# Patient Record
Sex: MALE | Race: BLACK OR AFRICAN AMERICAN | URBAN - METROPOLITAN AREA
[De-identification: names, ages, dates, MRNs, and addresses within clinical notes are randomized per-mention and may not be internally consistent; named-entity substitution may affect disease eponyms.]

---

## 2018-01-18 ENCOUNTER — OFFICE VISIT (OUTPATIENT)
Dept: PULMONOLOGY | Age: 48
End: 2018-01-18

## 2018-01-18 VITALS
HEIGHT: 66 IN | SYSTOLIC BLOOD PRESSURE: 138 MMHG | HEART RATE: 93 BPM | BODY MASS INDEX: 50.62 KG/M2 | WEIGHT: 315 LBS | OXYGEN SATURATION: 95 % | RESPIRATION RATE: 16 BRPM | DIASTOLIC BLOOD PRESSURE: 80 MMHG

## 2018-01-18 DIAGNOSIS — I10 ESSENTIAL HYPERTENSION: Chronic | ICD-10-CM

## 2018-01-18 DIAGNOSIS — G47.33 OBSTRUCTIVE SLEEP APNEA: Primary | Chronic | ICD-10-CM

## 2018-01-18 DIAGNOSIS — E11.9 CONTROLLED TYPE 2 DIABETES MELLITUS WITHOUT COMPLICATION, WITHOUT LONG-TERM CURRENT USE OF INSULIN (HCC): Chronic | ICD-10-CM

## 2018-01-18 DIAGNOSIS — J30.9 ALLERGIC RHINITIS, UNSPECIFIED CHRONICITY, UNSPECIFIED SEASONALITY, UNSPECIFIED TRIGGER: Chronic | ICD-10-CM

## 2018-01-18 PROCEDURE — 99214 OFFICE O/P EST MOD 30 MIN: CPT | Performed by: NURSE PRACTITIONER

## 2018-01-18 RX ORDER — AMLODIPINE BESYLATE 10 MG/1
10 TABLET ORAL DAILY
COMMUNITY
Start: 2017-12-31

## 2018-01-18 RX ORDER — ATORVASTATIN CALCIUM 10 MG/1
10 TABLET, FILM COATED ORAL DAILY
COMMUNITY
Start: 2017-12-31

## 2018-01-18 ASSESSMENT — SLEEP AND FATIGUE QUESTIONNAIRES
HOW LIKELY ARE YOU TO NOD OFF OR FALL ASLEEP IN A CAR, WHILE STOPPED FOR A FEW MINUTES IN TRAFFIC: 0
HOW LIKELY ARE YOU TO NOD OFF OR FALL ASLEEP WHILE SITTING QUIETLY AFTER LUNCH WITHOUT ALCOHOL: 0
HOW LIKELY ARE YOU TO NOD OFF OR FALL ASLEEP WHILE SITTING AND READING: 0
ESS TOTAL SCORE: 2
HOW LIKELY ARE YOU TO NOD OFF OR FALL ASLEEP WHEN YOU ARE A PASSENGER IN A CAR FOR AN HOUR WITHOUT A BREAK: 0
HOW LIKELY ARE YOU TO NOD OFF OR FALL ASLEEP WHILE SITTING INACTIVE IN A PUBLIC PLACE: 0
HOW LIKELY ARE YOU TO NOD OFF OR FALL ASLEEP WHILE SITTING AND TALKING TO SOMEONE: 0
HOW LIKELY ARE YOU TO NOD OFF OR FALL ASLEEP WHILE LYING DOWN TO REST IN THE AFTERNOON WHEN CIRCUMSTANCES PERMIT: 2
HOW LIKELY ARE YOU TO NOD OFF OR FALL ASLEEP WHILE WATCHING TV: 0

## 2018-01-18 ASSESSMENT — ENCOUNTER SYMPTOMS
ABDOMINAL DISTENTION: 0
RHINORRHEA: 0
ABDOMINAL PAIN: 0
SHORTNESS OF BREATH: 0
COUGH: 0
SINUS PRESSURE: 0
APNEA: 0

## 2018-01-18 NOTE — PROGRESS NOTES
Musa Starr MD, FAASM, [facility]  Phi Connelly, MSN, RN, CNP [place]  [address]  3rd Floor, [address]  [address], [address]  326 [facility] [phone]   [address] [place] [address] Drive 25 [facility]  [address]   [facility] AT [facility], [facility]. [address] [phone]       [address] MEDICINE    Subjective:     Patient ID: Sondra Crockett is a 50 y.o. male. Chief Complaint   Patient presents with    Sleep Apnea     CFU       HPI:      Machine Present today:  Yes and machine will not power on    Machine Modem/Download Info:                  CPAP - Settings  Pressure: 20 cmH2O                     PAP Mask  Mask Type: Nasal mask     [place] - Total score: 2    Follow-up :     Last Visit : 2015      Patient reports the listed Co-morbidities are stable and fluctuating at this time. Subjective Health Changes: None     Follow-up :      Patient is compliant with the machine  Not since the unit quit last week    Feeling rested when using the machine   Yes     Pressure is comfortable with inspiration and expiration  Yes     Noticed changes in pressure   na   Mask is fitting well  Yes   Noting Mask Air Leak  No   Having painful Aerophagia  No   Nocturia   2-3 and increase since machine stopped per night. Having  HA upon waking  No   Dry mouth upon waking   Yes   Congestion upon waking   No   Nose Bleeds  No   Using Sleep Aides  no   Understands how to change humidification and/or tubing temperature for comfort while at home  Yes.      Difficulties falling asleep  Yes   Difficulties staying asleep  Yes   Approximate time to bed  10-11pm   Approximate wake time  7-8:30am   Taking Naps  rare   If taking naps usual length  120 minutes   If taking naps using the machine  Yes   Drowsy when driving  No   Does patient carry a DOT/CDL  No   Does patient carry FAA/Pilots License   No    Any concerns noted with the machine at this time   Yes and machine will not power on   Patient ordered a new machine:   Concerns with

## 2018-04-05 ENCOUNTER — TELEPHONE (OUTPATIENT)
Dept: SLEEP MEDICINE | Age: 48
End: 2018-04-05

## 2018-04-05 ENCOUNTER — OFFICE VISIT (OUTPATIENT)
Dept: PULMONOLOGY | Age: 48
End: 2018-04-05

## 2018-04-05 VITALS
DIASTOLIC BLOOD PRESSURE: 80 MMHG | OXYGEN SATURATION: 98 % | BODY MASS INDEX: 50.62 KG/M2 | HEART RATE: 78 BPM | WEIGHT: 315 LBS | HEIGHT: 66 IN | SYSTOLIC BLOOD PRESSURE: 124 MMHG

## 2018-04-05 DIAGNOSIS — J30.9 ALLERGIC RHINITIS, UNSPECIFIED CHRONICITY, UNSPECIFIED SEASONALITY, UNSPECIFIED TRIGGER: Chronic | ICD-10-CM

## 2018-04-05 DIAGNOSIS — I10 ESSENTIAL HYPERTENSION: Chronic | ICD-10-CM

## 2018-04-05 DIAGNOSIS — E11.9 CONTROLLED TYPE 2 DIABETES MELLITUS WITHOUT COMPLICATION, WITHOUT LONG-TERM CURRENT USE OF INSULIN (HCC): Chronic | ICD-10-CM

## 2018-04-05 DIAGNOSIS — G47.33 OBSTRUCTIVE SLEEP APNEA: Primary | Chronic | ICD-10-CM

## 2018-04-05 PROCEDURE — 99214 OFFICE O/P EST MOD 30 MIN: CPT | Performed by: NURSE PRACTITIONER

## 2018-04-05 ASSESSMENT — ENCOUNTER SYMPTOMS
SINUS PRESSURE: 0
RHINORRHEA: 0
ABDOMINAL PAIN: 0
APNEA: 0
ABDOMINAL DISTENTION: 0
SHORTNESS OF BREATH: 0
COUGH: 0

## 2018-04-05 ASSESSMENT — SLEEP AND FATIGUE QUESTIONNAIRES
HOW LIKELY ARE YOU TO NOD OFF OR FALL ASLEEP IN A CAR, WHILE STOPPED FOR A FEW MINUTES IN TRAFFIC: 0
HOW LIKELY ARE YOU TO NOD OFF OR FALL ASLEEP WHEN YOU ARE A PASSENGER IN A CAR FOR AN HOUR WITHOUT A BREAK: 1
ESS TOTAL SCORE: 4
HOW LIKELY ARE YOU TO NOD OFF OR FALL ASLEEP WHILE SITTING AND READING: 1
HOW LIKELY ARE YOU TO NOD OFF OR FALL ASLEEP WHILE SITTING INACTIVE IN A PUBLIC PLACE: 0
HOW LIKELY ARE YOU TO NOD OFF OR FALL ASLEEP WHILE WATCHING TV: 1
HOW LIKELY ARE YOU TO NOD OFF OR FALL ASLEEP WHILE SITTING AND TALKING TO SOMEONE: 0
HOW LIKELY ARE YOU TO NOD OFF OR FALL ASLEEP WHILE LYING DOWN TO REST IN THE AFTERNOON WHEN CIRCUMSTANCES PERMIT: 1
HOW LIKELY ARE YOU TO NOD OFF OR FALL ASLEEP WHILE SITTING QUIETLY AFTER LUNCH WITHOUT ALCOHOL: 0

## 2018-04-27 ENCOUNTER — HOSPITAL ENCOUNTER (OUTPATIENT)
Dept: NON INVASIVE DIAGNOSTICS | Age: 48
Discharge: HOME | End: 2018-04-27

## 2018-04-27 DIAGNOSIS — R07.89 OTHER CHEST PAIN: ICD-10-CM

## 2019-04-08 ENCOUNTER — OFFICE VISIT (OUTPATIENT)
Dept: PULMONOLOGY | Age: 49
End: 2019-04-08
Payer: COMMERCIAL

## 2019-04-08 VITALS
OXYGEN SATURATION: 98 % | BODY MASS INDEX: 50.62 KG/M2 | HEIGHT: 66 IN | WEIGHT: 315 LBS | SYSTOLIC BLOOD PRESSURE: 126 MMHG | DIASTOLIC BLOOD PRESSURE: 70 MMHG | HEART RATE: 68 BPM

## 2019-04-08 DIAGNOSIS — I10 ESSENTIAL HYPERTENSION: ICD-10-CM

## 2019-04-08 DIAGNOSIS — E11.9 CONTROLLED TYPE 2 DIABETES MELLITUS WITHOUT COMPLICATION, WITHOUT LONG-TERM CURRENT USE OF INSULIN (HCC): ICD-10-CM

## 2019-04-08 DIAGNOSIS — E66.2 CLASS 3 OBESITY WITH ALVEOLAR HYPOVENTILATION WITHOUT SERIOUS COMORBIDITY WITH BODY MASS INDEX (BMI) OF 50.0 TO 59.9 IN ADULT (HCC): ICD-10-CM

## 2019-04-08 DIAGNOSIS — J30.9 ALLERGIC RHINITIS, UNSPECIFIED SEASONALITY, UNSPECIFIED TRIGGER: ICD-10-CM

## 2019-04-08 DIAGNOSIS — G47.33 OBSTRUCTIVE SLEEP APNEA: Primary | ICD-10-CM

## 2019-04-08 PROCEDURE — 99214 OFFICE O/P EST MOD 30 MIN: CPT | Performed by: NURSE PRACTITIONER

## 2019-04-08 RX ORDER — GABAPENTIN 100 MG/1
100 CAPSULE ORAL 3 TIMES DAILY
COMMUNITY
Start: 2019-04-04

## 2019-04-08 ASSESSMENT — ENCOUNTER SYMPTOMS
ABDOMINAL DISTENTION: 0
RHINORRHEA: 0
ABDOMINAL PAIN: 0
SHORTNESS OF BREATH: 0
EYE PAIN: 0
EYE REDNESS: 0
SINUS PRESSURE: 0
APNEA: 0
COUGH: 0

## 2019-04-08 ASSESSMENT — SLEEP AND FATIGUE QUESTIONNAIRES
HOW LIKELY ARE YOU TO NOD OFF OR FALL ASLEEP WHILE SITTING AND READING: 1
HOW LIKELY ARE YOU TO NOD OFF OR FALL ASLEEP WHEN YOU ARE A PASSENGER IN A CAR FOR AN HOUR WITHOUT A BREAK: 1
HOW LIKELY ARE YOU TO NOD OFF OR FALL ASLEEP WHILE SITTING QUIETLY AFTER LUNCH WITHOUT ALCOHOL: 1
HOW LIKELY ARE YOU TO NOD OFF OR FALL ASLEEP WHILE LYING DOWN TO REST IN THE AFTERNOON WHEN CIRCUMSTANCES PERMIT: 0
ESS TOTAL SCORE: 4
HOW LIKELY ARE YOU TO NOD OFF OR FALL ASLEEP WHILE WATCHING TV: 1
HOW LIKELY ARE YOU TO NOD OFF OR FALL ASLEEP WHILE SITTING INACTIVE IN A PUBLIC PLACE: 0
HOW LIKELY ARE YOU TO NOD OFF OR FALL ASLEEP IN A CAR, WHILE STOPPED FOR A FEW MINUTES IN TRAFFIC: 0
HOW LIKELY ARE YOU TO NOD OFF OR FALL ASLEEP WHILE SITTING AND TALKING TO SOMEONE: 0

## 2019-04-08 NOTE — PROGRESS NOTES
Gloria Morales MD, FAASM, [facility]  Rach Montesinos, MSN, RN, CNP     [address] [place] [address] SLEEP MEDICINE  [address] [place] [id number]  Dept: [phone]  Dept Fax: : [phone] [facility] [address] [id number] [phone]    Subjective:     Patient ID: Saad Kimball is a 46 y.o. male. Chief Complaint   Patient presents with    Sleep Apnea       HPI:      Machine Present today:  Yes    Machine Modem/Download Info:  Compliance (hours/night): 6.75 hrs/night  Download AHI (/hour): 2.2 /HR     Average IPAP Pressure: 17.7 cmH2O  Average EPAP Pressure: 14.8 cmH2O         AUTO BIPAP - Settings (Daniele)  IPAP Max: 25 cmH2O  EPAP Min: 14 cmH2O  Pressure Support Min: 2  Pressure Support Max: 4             Comfort Settings  Humidity Level (0-8): 3  Flex/EPR (0-3): 3 PAP Mask  Mask Type: Nasal mask  Mask Model: Wisp  Mask Size: XL     [place] - Total score: 4    Follow-up :     Last Visit : April 2018      Patient reports the listed chronic Co-morbidities: Obesity, DM, HTN, Allergies   are well controlled and stable at this time. Subjective Health Changes: None     Over Night Oximetry: [] Yes  [] No  [x] NA   Using O2: [] Yes  [] No  [x] NA   Patient is compliant with the machine  [x] Yes  [] No   Feeling rested when using the machine   [x] Yes  [] No     Pressure is comfortable with inspiration and expiration  [x] Yes  [] No     Noticed changes in pressure   [] Yes  [] No  [x] NA   Mask is fitting well  [x] Yes  [] No   Noting Mask Air Leak  [x] Yes  [] No   Having painful Aerophagia  [] Yes  [x] No   Nocturia   3  per night.    Having  HA upon waking  [] Yes  [x] No   Dry mouth upon waking   Dry Nose  Dry Eyes  [x] Yes  [] No   Congestion upon waking   [] Yes  [x] No    Nose Bleeds  [] Yes  [x] No   Using Sleep Aides    [x] NA   Understands how to change humidification and/or tubing temperature for comfort while at home  [x] Yes  [] No     Difficulties falling asleep  [] Yes  [x] No   Difficulties staying asleep  [] Yes  [x] No   Approximate time to bed  10:30-11pm   Approximate wake time  6:30-7am   Taking Naps  no   If taking naps usual length    [x] NA   If taking naps using the machine  [] Yes  [] No  [x] NA   Drowsy when driving  [] Yes  [x] No     Does patient carry a DOT/CDL  [] Yes  [x] No     Does patient carry FAA/Pilots License   [] Yes  [x] No      Any concerns noted with the machine at this time  [] Yes  [x] No        Diagnosis Orders   1. Obstructive sleep apnea     2. Controlled type 2 diabetes mellitus without complication, without long-term current use of insulin ([facility] [address]     3. Essential hypertension     4. Allergic rhinitis, unspecified seasonality, unspecified trigger     5. Class 3 obesity with alveolar hypoventilation without serious comorbidity with body mass index (BMI) of 50.0 to 59.9 in adult [facility])         The chronic medical conditions listed are directly related to the primary diagnosis listed above. The management of the primary diagnosis affects the secondary diagnosis and vice versa. Review of Systems   Constitutional: Negative for appetite change, chills, fatigue and fever. HENT: Negative for congestion, nosebleeds, rhinorrhea and sinus pressure. Eyes: Negative for pain and redness. Respiratory: Negative for apnea, cough and shortness of breath. Cardiovascular: Negative for chest pain and palpitations. Gastrointestinal: Negative for abdominal distention and abdominal pain. Neurological: Negative for dizziness and headaches. Psychiatric/Behavioral: Negative for sleep disturbance.        Social History     Socioeconomic History    Marital status:      Spouse name: Not on file    Number of children: Not on file    Years of education: Not on file    Highest education level: Not on file   Occupational History  Not on file   Social Needs    Financial resource strain: Not on file    Food insecurity:     Worry: Not on file     Inability: Not on file    Transportation needs:     Medical: Not on file     Non-medical: Not on file   Tobacco Use    Smoking status: Never Smoker    Smokeless tobacco: Never Used   Substance and Sexual Activity    Alcohol use: No    Drug use: No    Sexual activity: Not on file   Lifestyle    Physical activity:     Days per week: Not on file     Minutes per session: Not on file    Stress: Not on file   Relationships    Social connections:     Talks on phone: Not on file     Gets together: Not on file     Attends Shinto service: Not on file     Active member of club or organization: Not on file     Attends meetings of clubs or organizations: Not on file     Relationship status: Not on file    Intimate partner violence:     Fear of current or ex partner: Not on file     Emotionally abused: Not on file     Physically abused: Not on file     Forced sexual activity: Not on file   Other Topics Concern    Not on file   Social History Narrative    Not on file       Prior to Admission medications    Medication Sig Start Date End Date Taking? Authorizing Provider   gabapentin (NEURONTIN) 100 MG capsule Take 100 mg by mouth 3 times daily. 4/4/19  Yes Historical Provider, MD   atorvastatin (LIPITOR) 10 MG tablet Take 10 mg by mouth daily 12/31/17  Yes Historical Provider, MD   amLODIPine (NORVASC) 10 MG tablet Take 10 mg by mouth daily 12/31/17  Yes Historical Provider, MD   metFORMIN ER (GLUCOPHAGE-XR) 500 MG XR tablet Take 500 mg by mouth daily (with breakfast). Yes Historical Provider, MD   lisinopril (PRINIVIL;ZESTRIL) 10 MG tablet Take 10 mg by mouth daily. Yes Historical Provider, MD   Multiple Vitamin (MULTI VITAMIN DAILY PO) Take  by mouth. Yes Historical Provider, MD   cetirizine (ZYRTEC) 10 MG tablet Take 10 mg by mouth daily.    Yes Historical Provider, MD   Vitamin D (CHOLECALCIFEROL) 1000 UNITS CAPS capsule Take 1,000 Units by mouth daily. Yes Historical Provider, MD       Allergies as of 04/08/2019    (No Known Allergies)       Patient Active Problem List   Diagnosis    Obstructive sleep apnea    High blood pressure    Diabetes mellitus type 2, controlled, without complications (HCC)    Hyperlipidemia    Allergic rhinitis    Class 3 obesity with alveolar hypoventilation without serious comorbidity with body mass index (BMI) of 50.0 to 59.9 in adult [facility])       Past Medical History:   Diagnosis Date    Anemia     Diabetes mellitus ([address]     High cholesterol     Hypertension     Sleep apnea        History reviewed. No pertinent surgical history. Family History   Problem Relation Age of Onset    Heart Disease Father     Kidney Disease Maternal Grandmother     Diabetes Maternal Grandmother     Asthma Neg Hx     Cancer Neg Hx     Emphysema Neg Hx     Heart Failure Neg Hx     Hypertension Neg Hx        Vitals:  Weight BMI   Wt Readings from Last 3 Encounters:   04/08/19 (!) 360 lb (163.3 kg)   04/05/18 (!) 366 lb 9.6 oz (166.3 kg)   01/18/18 (!) 331 lb (150.1 kg)    Body mass index is 58.11 kg/m². BP HR SaO2   BP Readings from Last 3 Encounters:   04/08/19 126/70   04/05/18 124/80   01/18/18 138/80    Pulse Readings from Last 3 Encounters:   04/08/19 68   04/05/18 78   01/18/18 93    SpO2 Readings from Last 3 Encounters:   04/08/19 98%   04/05/18 98%   01/18/18 95%        Assessment/Plan:     Diabetes mellitus type 2, controlled, without complications (HCC)  Chronic- Stable. Cont meds per PCP and other physicians. High blood pressure  Chronic- Stable. Cont meds per PCP and other physicians. Allergic rhinitis  Chronic- Stable. Cont meds per PCP and other physicians. Class 3 obesity with alveolar hypoventilation without serious comorbidity with body mass index (BMI) of 50.0 to 59.9 in adult (HCC)  Chronic-Stable.   Encouraged him to work on weight loss through diet and exercise. Obstructive sleep apnea  Reviewed compliance download with pt. Supplies and parts as needed for his machine. These are medically necessary. Continue medications per his PCP and other physicians. Limit caffeine use after 3pm.  Encouraged him to work on weight loss through diet and exercise. Diagnoses of Controlled type 2 diabetes mellitus without complication, without long-term current use of insulin ([address] Essential hypertension, Allergic rhinitis, unspecified seasonality, unspecified trigger, and Class 3 obesity with alveolar hypoventilation without serious comorbidity with body mass index (BMI) of 50.0 to 59.9 in [facility]) were pertinent to this visit. The chronic medical conditions listed are directly related to the primary diagnosis listed above. The management of the primary diagnosis affects the secondary diagnosis and vice versa. The primary encounter diagnosis was Obstructive sleep apnea. Diagnoses of Controlled type 2 diabetes mellitus without complication, without long-term current use of insulin ([address] Essential hypertension, Allergic rhinitis, unspecified seasonality, unspecified trigger, and Class 3 obesity with alveolar hypoventilation without serious comorbidity with body mass index (BMI) of 50.0 to 59.9 in [facility]) were also pertinent to this visit. The chronic medical conditions listed are directly related to the primary diagnosis listed above. The management of the primary diagnosis affects the secondary diagnosis and vice versa. - Educated patient and reviewed compliance download with pt.    -Supplies and parts as needed for his machine, these are medically necessary.    - Patient using [employer] for supplies  -Continue medications per his PCP and other physicians.   -Limit caffeine use after 3pm.    -Encouraged him to work on weight loss through diet and exercise.   - Pt would medically benefit from wt loss for EDDIE (diet,

## 2019-04-08 NOTE — ASSESSMENT & PLAN NOTE
Reviewed compliance download with pt. Supplies and parts as needed for his machine. These are medically necessary. Continue medications per his PCP and other physicians. Limit caffeine use after 3pm.  Encouraged him to work on weight loss through diet and exercise. Diagnoses of Controlled type 2 diabetes mellitus without complication, without long-term current use of insulin ([facility] [address] Essential hypertension, Allergic rhinitis, unspecified seasonality, unspecified trigger, and Class 3 obesity with alveolar hypoventilation without serious comorbidity with body mass index (BMI) of 50.0 to 59.9 in [facility]) were pertinent to this visit. The chronic medical conditions listed are directly related to the primary diagnosis listed above. The management of the primary diagnosis affects the secondary diagnosis and vice versa.

## 2019-04-08 NOTE — PROGRESS NOTES
Valentín Silva         : 1970    Diagnosis: [x] EDDIE (G47.33) [] CSA (G47.31) [] Apnea (G47.30)   Length of Need: [x] 15 Months [] 99 Months [] Other:    Machine (GET!): [x] Respironics Dream Station      Auto [] ResMed AirSense     Auto [] Other:     []  CPAP () [] Bilevel ()   Mode: [] Auto [] Spontaneous    Mode: [] Auto [] Spontaneous                            Comfort Settings:   - Ramp Pressure:  cmH2O                                        - Ramp time: 15 min                                     -  Flex/EPR - 3 full time                                    - For ResMed Bilevel (TiMax-4 sec   TiMin- 0.2 sec)     Humidifier: [x] Heated ()        [x] Water chamber replacement ()/ 1 per 6 months        Mask:   [x] Nasal () /1 per 3 months [] Full Face () /1 per 3 months   [x] Patient choice -Size and fit mask [] Patient Choice - Size and fit mask   [] Dispense:  [] Dispense:    [x] Headgear () / 1 per 3 months [] Headgear () / 1 per 3 months   [x] Replacement Nasal Cushion ()/2 per month [] Interface Replacement ()/1 per month   [] Replacement Nasal Pillows ()/2 per month         Tubing: [x] Heated ()/1 per 3 months    [] Standard ()/1 per 3 months [] Other:           Filters: [x] Non-disposable ()/1 per 6 months     [x] Ultra-Fine, Disposable ()/2 per month        Miscellaneous: [] Chin Strap ()/ 1 per 6 months [] O2 bleed-in:       LPM   [] Oxymetry on CPAP/Bilevel []  Other:          Start Order Date: 19    MEDICAL JUSTIFICATION:  I, the undersigned, certify that the above prescribed supplies are medically necessary for this patients wellbeing. In my opinion, the supplies are both reasonable and necessary in reference to accepted standards of medicalpractice in treatment of this patients condition.     HLA Su - CNP      NPI: [id number]       Order Signed Date: 19    Electronically signed by

## 2020-04-13 ENCOUNTER — VIRTUAL VISIT (OUTPATIENT)
Dept: PULMONOLOGY | Age: 50
End: 2020-04-13
Payer: COMMERCIAL

## 2020-04-13 PROCEDURE — 99214 OFFICE O/P EST MOD 30 MIN: CPT | Performed by: NURSE PRACTITIONER

## 2020-04-13 ASSESSMENT — ENCOUNTER SYMPTOMS
EYE REDNESS: 0
SHORTNESS OF BREATH: 0
APNEA: 0
SINUS PRESSURE: 0
RHINORRHEA: 0
ABDOMINAL DISTENTION: 0
COUGH: 0
EYE PAIN: 0
ABDOMINAL PAIN: 0

## 2020-04-13 ASSESSMENT — SLEEP AND FATIGUE QUESTIONNAIRES
HOW LIKELY ARE YOU TO NOD OFF OR FALL ASLEEP WHILE WATCHING TV: 0
HOW LIKELY ARE YOU TO NOD OFF OR FALL ASLEEP WHILE SITTING INACTIVE IN A PUBLIC PLACE: 0
HOW LIKELY ARE YOU TO NOD OFF OR FALL ASLEEP WHILE SITTING AND TALKING TO SOMEONE: 0
HOW LIKELY ARE YOU TO NOD OFF OR FALL ASLEEP WHEN YOU ARE A PASSENGER IN A CAR FOR AN HOUR WITHOUT A BREAK: 0
HOW LIKELY ARE YOU TO NOD OFF OR FALL ASLEEP WHILE SITTING QUIETLY AFTER LUNCH WITHOUT ALCOHOL: 0
ESS TOTAL SCORE: 0
HOW LIKELY ARE YOU TO NOD OFF OR FALL ASLEEP WHILE LYING DOWN TO REST IN THE AFTERNOON WHEN CIRCUMSTANCES PERMIT: 0
HOW LIKELY ARE YOU TO NOD OFF OR FALL ASLEEP IN A CAR, WHILE STOPPED FOR A FEW MINUTES IN TRAFFIC: 0
HOW LIKELY ARE YOU TO NOD OFF OR FALL ASLEEP WHILE SITTING AND READING: 0

## 2020-04-13 NOTE — PROGRESS NOTES
Christiane Joseph         : 1970    Diagnosis: [x] EDDIE (G47.33) [] CSA (G47.31) [] Apnea (G47.30)   Length of Need: [x] 12 Months [] 99 Months [] Other:    Machine (GET!): [x] Respironics Dream Station      Auto [] ResMed AirSense     Auto [] Other:     []  CPAP () [] Bilevel ()   Mode: [] Auto [] Spontaneous    Mode: [] Auto [] Spontaneous                            Comfort Settings:   - Ramp Pressure:  cmH2O                                        - Ramp time: 15 min                                     -  Flex/EPR - 3 full time                                    - For ResMed Bilevel (TiMax-4 sec   TiMin- 0.2 sec)     Humidifier: [x] Heated ()        [x] Water chamber replacement ()/ 1 per 6 months        Mask:   [x] Nasal () /1 per 3 months [] Full Face () /1 per 3 months   [x] Patient choice -Size and fit mask [] Patient Choice - Size and fit mask   [] Dispense:  [] Dispense:    [x] Headgear () / 1 per 3 months [] Headgear () / 1 per 3 months   [x] Replacement Nasal Cushion ()/2 per month [] Interface Replacement ()/1 per month   [] Replacement Nasal Pillows ()/2 per month         Tubing: [x] Heated ()/1 per 3 months    [] Standard ()/1 per 3 months [] Other:           Filters: [x] Non-disposable ()/1 per 6 months     [x] Ultra-Fine, Disposable ()/2 per month        Miscellaneous: [] Chin Strap ()/ 1 per 6 months [] O2 bleed-in:       LPM   [] Oximetry on CPAP/Bilevel []  Other:    [x] Modem: ()         Start Order Date: 20    MEDICAL JUSTIFICATION:  I, the undersigned, certify that the above prescribed supplies are medically necessary for this patients wellbeing. In my opinion, the supplies are both reasonable and necessary in reference to accepted standards of medicalpractice in treatment of this patients condition.     HAL Buchanan - CNP      NPI: [id number]       Order Signed Date:

## 2020-04-13 NOTE — PROGRESS NOTES
No     Pressure is comfortable with inspiration and expiration  [x] Yes  [] No     Noticed changes in pressure   [] Yes  [] No  [x] NA   Mask is fitting well  [x] Yes  [] No   Noting Mask Air Leak  [] Yes  [x] No   Having painful Aerophagia  [] Yes  [x] No   Nocturia   3  per night. Having  HA upon waking  [] Yes  [x] No   Dry mouth upon waking   Dry Nose  Dry Eyes  [x] Yes  [] No   Congestion upon waking   [] Yes  [x] No    Nose Bleeds  [] Yes  [x] No   Using Sleep Aides    [x] NA   Understands how to change humidification and/or tubing temperature for comfort while at home  [x] Yes  [] No     Difficulties falling asleep  [] Yes  [x] No   Difficulties staying asleep  [] Yes  [x] No   Approximate time to bed  10-10:30pm   Approximate wake time  6:45-9:30am   Taking Naps  occasional   If taking naps usual length  120 minutes  [] NA   If taking naps using the machine  [x] Yes  [] No  [] NA [] With and With out    Drowsy when driving  [] Yes  [x] No     Does patient carry a DOT/CDL  [] Yes  [x] No     Does patient carry FAA/Pilots License   [] Yes  [x] No      Any concerns noted with the machine at this time  [] Yes  [x] No        Diagnosis Orders   1. Obstructive sleep apnea     2. Controlled type 2 diabetes mellitus without complication, without long-term current use of insulin ([address]     3. Essential hypertension     4. Class 3 obesity with alveolar hypoventilation without serious comorbidity with body mass index (BMI) of 50.0 to 59.9 in adult ([address]     5. Allergic rhinitis, unspecified seasonality, unspecified trigger         The chronic medical conditions listed are directly related to the primary diagnosis listed above. The management of the primary diagnosis affects the secondary diagnosis and vice versa. Review of Systems   Constitutional: Negative for appetite change, chills, fatigue and fever. HENT: Negative for congestion, nosebleeds, rhinorrhea and sinus pressure.     Eyes: Negative for pain and Historical Provider, MD   amLODIPine (NORVASC) 10 MG tablet Take 10 mg by mouth daily 12/31/17   Historical Provider, MD   metFORMIN ER (GLUCOPHAGE-XR) 500 MG XR tablet Take 500 mg by mouth daily (with breakfast). Historical Provider, MD   lisinopril (PRINIVIL;ZESTRIL) 10 MG tablet Take 10 mg by mouth daily. Historical Provider, MD   Multiple Vitamin (MULTI VITAMIN DAILY PO) Take  by mouth. Historical Provider, MD   cetirizine (ZYRTEC) 10 MG tablet Take 10 mg by mouth daily. Historical Provider, MD   Vitamin D (CHOLECALCIFEROL) 1000 UNITS CAPS capsule Take 1,000 Units by mouth daily. Historical Provider, MD       Allergies as of 04/13/2020    (No Known Allergies)       Patient Active Problem List   Diagnosis    Obstructive sleep apnea    High blood pressure    Diabetes mellitus type 2, controlled, without complications ([facility] [address]    Hyperlipidemia    Allergic rhinitis    Class 3 obesity with alveolar hypoventilation without serious comorbidity with body mass index (BMI) of 50.0 to 59.9 in adult [facility])       Past Medical History:   Diagnosis Date    Anemia     Diabetes mellitus ([facility] [address]     High cholesterol     Hypertension     Sleep apnea        History reviewed. No pertinent surgical history. Family History   Problem Relation Age of Onset    Heart Disease Father     Kidney Disease Maternal Grandmother     Diabetes Maternal Grandmother     Asthma Neg Hx     Cancer Neg Hx     Emphysema Neg Hx     Heart Failure Neg Hx     Hypertension Neg Hx        Vitals:  Weight BMI   Wt Readings from Last 3 Encounters:   04/08/19 (!) 360 lb (163.3 kg)   04/05/18 (!) 366 lb 9.6 oz (166.3 kg)   01/18/18 (!) 331 lb (150.1 kg)    There is no height or weight on file to calculate BMI.      BP HR SaO2   BP Readings from Last 3 Encounters:   04/08/19 126/70   04/05/18 124/80   01/18/18 138/80    Pulse Readings from Last 3 Encounters:   04/08/19 68   04/05/18 78   01/18/18 93    SpO2 Readings from Last 3

## 2021-04-16 ENCOUNTER — TELEPHONE (OUTPATIENT)
Dept: PULMONOLOGY | Age: 51
End: 2021-04-16

## 2021-04-16 ENCOUNTER — VIRTUAL VISIT (OUTPATIENT)
Dept: PULMONOLOGY | Age: 51
End: 2021-04-16
Payer: COMMERCIAL

## 2021-04-16 DIAGNOSIS — I10 ESSENTIAL HYPERTENSION: ICD-10-CM

## 2021-04-16 DIAGNOSIS — G47.33 OBSTRUCTIVE SLEEP APNEA: Primary | ICD-10-CM

## 2021-04-16 DIAGNOSIS — E66.2 CLASS 3 OBESITY WITH ALVEOLAR HYPOVENTILATION WITHOUT SERIOUS COMORBIDITY WITH BODY MASS INDEX (BMI) OF 50.0 TO 59.9 IN ADULT (HCC): ICD-10-CM

## 2021-04-16 DIAGNOSIS — E11.9 CONTROLLED TYPE 2 DIABETES MELLITUS WITHOUT COMPLICATION, WITHOUT LONG-TERM CURRENT USE OF INSULIN (HCC): ICD-10-CM

## 2021-04-16 PROCEDURE — 99214 OFFICE O/P EST MOD 30 MIN: CPT | Performed by: NURSE PRACTITIONER

## 2021-04-16 ASSESSMENT — SLEEP AND FATIGUE QUESTIONNAIRES
HOW LIKELY ARE YOU TO NOD OFF OR FALL ASLEEP WHILE SITTING QUIETLY AFTER LUNCH WITHOUT ALCOHOL: 0
HOW LIKELY ARE YOU TO NOD OFF OR FALL ASLEEP WHILE SITTING INACTIVE IN A PUBLIC PLACE: 0
ESS TOTAL SCORE: 0
HOW LIKELY ARE YOU TO NOD OFF OR FALL ASLEEP WHILE SITTING AND TALKING TO SOMEONE: 0
HOW LIKELY ARE YOU TO NOD OFF OR FALL ASLEEP WHILE SITTING AND READING: 0
HOW LIKELY ARE YOU TO NOD OFF OR FALL ASLEEP WHEN YOU ARE A PASSENGER IN A CAR FOR AN HOUR WITHOUT A BREAK: 0

## 2021-04-16 NOTE — PROGRESS NOTES
Favio Antoine MD, FAA, [facility]  Yanci Arias, MSN, RN, [address] [facility] [address] [phone]  Dept: [phone]  Dept Fax: [phone]  Loc: [phone]    Subjective:     Patient ID: Johnathan Duenas is a 44 y.o. male. Chief Complaint   Patient presents with    Sleep Apnea       HPI:      Sleep Medicine Video Visit    Pursuant to the emergency declaration under the [address] [place] [address] waiver authority and the [employer] Act this Telephone Visit was insisted, with patient's consent, to reduce the patient's risk of exposure to COVID-19 and provide continuity of care for an established patient. Services were provided through a synchronous discussion over a telephone and/or Video chat to substitute for in-person clinic visit, and coded as such. While patient is at home.     Machine Modem/Download Info:  Compliance (hours/night): 8.1 hrs/night  Download AHI (/hour): 6.7 /HR     Average IPAP Pressure: 17.5 cmH2O  Average EPAP Pressure: 14.5 cmH2O         AUTO BIPAP - Settings (Daniele)  IPAP Max: 25 cmH2O  EPAP Min: 14 cmH2O  Pressure Support Min: 2  Pressure Support Max: 4               PAP Mask  Mask Type: Nasal mask     [place] - Total score: 0    Follow-up :     Last Visit : April 2020      Subjective Health Changes: None      Over Night Oximetry: [] Yes  [] No  [x] NA [] WNL   Using O2: [] Yes  [] No  [x] NA   Patient is compliant with the machine  [x] Yes  [] No [] Per patient (per patient machine)   Feeling rested when using the machine   [x] Yes  [] No     Pressure is comfortable with inspiration and expiration  [x] Yes  [] No   ([x] NA   [] Feeling of suffocation  [] Feeling like not enough air    [] To much pressure)     Noticed changes in pressure  [x] NA  [] Yes    [] No     Mask is fitting well  [x] Yes  [] No   Noting Mask Air Leak [] Yes  [x] No   Having painful Aerophagia  [] Yes  [x] No   Nocturia   3  per night. Having  HA upon waking  [] Yes  [x] No   Dry mouth upon waking   Dry Nose  Dry Eyes  [x] Yes  [] No   Congestion upon waking   [] Yes  [x] No    Nose Bleeds  [] Yes  [x] No   Using Sleep Aides  [x] NA  [] OTC  [] Per our office   [] Per another provider   Understands how to change humidification and/or tubing temperature for comfort while at home  [x] Yes  [] No     Difficulties falling asleep  [] Yes  [x] No   Difficulties staying asleep  [] Yes  [x] No   Approximate time to bed  11:30pm   Approximate wake time  8am   Taking Naps  no   If taking naps usual length  [x] NA   If taking naps using the machine [x] NA  [] Yes    [] No    [] With and With out    Drowsy when driving  [] Yes  [x] No     Does patient carry a DOT/CDL  [] Yes  [x] No     Does patient carry FAA/[employer] License   [] Yes  [x] No      Any concerns noted with the machine at this time  [] Yes  [x] No       Assessment/Plan:   1. Obstructive sleep apnea  Assessment & Plan:  After downloading data and reviewing  Reviewed compliance download with pt. Supplies and parts as needed for his machine. These are medically necessary. Continue medications per his PCP and other physicians. Limit caffeine use after 3pm.    The chronic medical conditions listed are directly related to the primary diagnosis listed above. The management of the primary diagnosis affects the secondary diagnosis and vice versa    Patient is complaint encouraged to maintain compliance to aide on controlling other stated healthcare concerns. 2. Essential hypertension  Assessment & Plan:  Chronic- stable. After speaking with patient:    Agree with current plan, and would agree to continue this plan per prescribing and managing physician. 3. Controlled type 2 diabetes mellitus without complication, without long-term current use of insulin (HCC)  Assessment & Plan:  Chronic- stable. After speaking with patient:    Agree with current plan, and would agree to continue this plan per prescribing and managing physician. 4. Class 3 obesity with alveolar hypoventilation without serious comorbidity with body mass index (BMI) of 50.0 to 59.9 in adult [facility])  Assessment & Plan:  Patient encouraged to work on maintaining a healthy weight per height. Achievable with diet restriction/modifications and exercise (may consult primary care if unsure of any restrictions or concerns). Weight management directly correlates to risk of control and maintenance of Obstructive Sleep Apnea. The chronic medical conditions listed are directly related to the primary diagnosis listed above. The management of the primary diagnosis affects the secondary diagnosis and vice versa. - After pulling data and reviewing it   - Reviewed compliance download with patient    -Medically necessary supplies and parts as needed for his machine.   - Continue medications per his primary care provider and other physicians.   - Encouraged to limit caffeine use after 3pm.    -  Encouraged him to work on weight loss through diet and exercise  - Educated not to drive when feeling sleepy   - Patient using [employer]  - The potential need to take the machine to the DME to trouble shoot, to allow the office to get a download  Office locations    The chronic medical conditions listed are directly related to the primary diagnosis listed above. The management of the primary diagnosis affects the secondary diagnosis and vice versa.     - Will follow up in off in 12 months

## 2021-04-16 NOTE — ASSESSMENT & PLAN NOTE
After downloading data and reviewing  Reviewed compliance download with pt. Supplies and parts as needed for his machine. These are medically necessary. Continue medications per his PCP and other physicians. Limit caffeine use after 3pm.    The chronic medical conditions listed are directly related to the primary diagnosis listed above. The management of the primary diagnosis affects the secondary diagnosis and vice versa    Patient is complaint encouraged to maintain compliance to aide on controlling other stated healthcare concerns.

## 2021-04-16 NOTE — ASSESSMENT & PLAN NOTE
Patient encouraged to work on maintaining a healthy weight per height. Achievable with diet restriction/modifications and exercise (may consult primary care if unsure of any restrictions or concerns). Weight management directly correlates to risk of control and maintenance of Obstructive Sleep Apnea.

## 2021-04-16 NOTE — PROGRESS NOTES
Katelin Dominguez         : 1970    Diagnosis: [x] EDDIE (G47.33) [] CSA (G47.31) [] Apnea (G47.30)   Length of Need: [x] 12 Months [] 99 Months [] Other:    Machine (GET!): [x] Respironics Dream Station   2   Auto [] ResMed AirSense     Auto [] Other:     []  CPAP () [] Bilevel ()   Mode: [] Auto [] Spontaneous    Mode: [] Auto [] Spontaneous                            Comfort Settings:   - Ramp Pressure:  cmH2O                                        - Ramp time: 15 min                                     -  Flex/EPR - 3 full time                                    - For ResMed Bilevel (TiMax-4 sec   TiMin- 0.2 sec)     Humidifier: [x] Heated ()        [x] Water chamber replacement ()/ 1 per 6 months        Mask:   [x] Nasal () /1 per 3 months [] Full Face () /1 per 3 months   [x] Patient choice -Size and fit mask [] Patient Choice - Size and fit mask   [] Dispense:  [] Dispense:    [x] Headgear () / 1 per 3 months [] Headgear () / 1 per 3 months   [x] Replacement Nasal Cushion ()/2 per month [] Interface Replacement ()/1 per month   [] Replacement Nasal Pillows ()/2 per month         Tubing: [x] Heated ()/1 per 3 months    [] Standard ()/1 per 3 months [] Other:           Filters: [x] Non-disposable ()/1 per 6 months     [x] Ultra-Fine, Disposable ()/2 per month        Miscellaneous: [] Chin Strap ()/ 1 per 6 months [] O2 bleed-in:       LPM   [] Oximetry on CPAP/Bilevel []  Other:    [x] Modem: ()         Start Order Date: 21    MEDICAL JUSTIFICATION:  I, the undersigned, certify that the above prescribed supplies are medically necessary for this patients wellbeing. In my opinion, the supplies are both reasonable and necessary in reference to accepted standards of medicalpractice in treatment of this patients condition.     HAL Francisco - CNP      NPI: [id number]       Order Signed Date: 21    Electronically signed by HAL Nielson CNP on 2021 at 8:06 AM    Scooter Florez  1970  [address]  [address] [facility] [address]  [phone] (home) [phone] (work)  [phone] (mobile)      Insurance Info (confirm with patient if correct):  Payor/Plan Subscr  Sex Relation Sub.  Ins. ID Effective Group Num

## 2021-04-16 NOTE — TELEPHONE ENCOUNTER
Attempted to reach the patient for their appointment.      Left message on the patients machine to complete or reschedule their appointment    Link sent also

## 2022-02-24 NOTE — PROGRESS NOTES
Patient reached ____ yes  ___x__ no   VM instructions left __x__ yes   phone number ___[phone]_____                                ____ no-office notified          Date _[id number]________  Time ____0930___  Arrival ____0800__    Nothing to eat or drink after midnight-follow your doctors prep instructions-this may include taking a second dose of your prep after midnight  Responsible adult 25 or older to stay on site while you are here-drive you home-stay with you after  Follow any instructions your doctors office has given you  Bring a complete list of all your medications and supplements including name,dose,how often taken the day of your procedure  If you normally take the following medications in the morning please do so the AM of your procedure with a small sip of water       Heart,blood pressure,seizure,thyroid or breathing medications-use your inhalers       DO NOT take blood pressure medications ending in \"rebeka\" or \"pril\" the AM of procedure or evening prior  Take half or your normal dose of any long acting insulins the night before your procedure-do not take any diabetic medications the AM of procedure  Follow your doctors instructions regarding stopping or taking  any blood thinners-if you do not have instructions-call them  Any questions call your doctor  Other ______________________________________________________________      COVID TEST        _x__ Covid test to be done 3-5 days prior to scheduled surgery patient aware they are REQUIRED to bring a copy of the negative result DOS-if they receive a positive result to notify their surgeon. If known-indicate where patient is getting covid test done          Left message to bring results DOS_____________________________________________    ___ Rapid - DOS    ___ Other________________________________________            Jaz Viramontes POLICY(subject to change)         There is a one visitor policy at [facility] for all surgeries and endoscopies. Whether the visitor can stay or will be asked to wait in the car will depend on the current policy and if social distancing can be maintained. The policy is subject to change at any time. Please make sure the visitor has a cell phone that is on,charged and able to accept calls, as this may be the way that the staff communicates with them. Pain management is NO VISITOR policyThe patients ride is expected to remain in the car with a cell phone for communication. If the ride is leaving the hospital grounds please make sure they are back in time for pickup. Have the patient inform the staff on arrival what their rides plans are while the patient is in the facility. At the MAIN there is one visitor allowed. Please note that the visitor policy is subject to change.

## 2022-03-04 ENCOUNTER — ANESTHESIA EVENT (OUTPATIENT)
Dept: ENDOSCOPY | Age: 52
End: 2022-03-04
Payer: COMMERCIAL

## 2022-03-04 ENCOUNTER — ANESTHESIA (OUTPATIENT)
Dept: ENDOSCOPY | Age: 52
End: 2022-03-04
Payer: COMMERCIAL

## 2022-03-04 ENCOUNTER — HOSPITAL ENCOUNTER (OUTPATIENT)
Age: 52
Setting detail: OUTPATIENT SURGERY
Discharge: HOME | End: 2022-03-04
Attending: INTERNAL MEDICINE | Admitting: INTERNAL MEDICINE
Payer: COMMERCIAL

## 2022-03-04 VITALS
WEIGHT: 315 LBS | RESPIRATION RATE: 15 BRPM | SYSTOLIC BLOOD PRESSURE: 164 MMHG | HEIGHT: 65 IN | DIASTOLIC BLOOD PRESSURE: 86 MMHG | HEART RATE: 89 BPM | TEMPERATURE: 97 F | OXYGEN SATURATION: 99 % | BODY MASS INDEX: 52.48 KG/M2

## 2022-03-04 VITALS — DIASTOLIC BLOOD PRESSURE: 68 MMHG | OXYGEN SATURATION: 96 % | SYSTOLIC BLOOD PRESSURE: 144 MMHG

## 2022-03-04 LAB
GLUCOSE BLD-MCNC: 129 MG/DL (ref 70–99)
GLUCOSE BLD-MCNC: 134 MG/DL (ref 70–99)
PERFORMED ON: ABNORMAL
PERFORMED ON: ABNORMAL

## 2022-03-04 PROCEDURE — 7100000000 HC PACU RECOVERY - FIRST 15 MIN: Performed by: INTERNAL MEDICINE

## 2022-03-04 PROCEDURE — 2580000003 HC RX 258: Performed by: ANESTHESIOLOGY

## 2022-03-04 PROCEDURE — 3700000000 HC ANESTHESIA ATTENDED CARE: Performed by: INTERNAL MEDICINE

## 2022-03-04 PROCEDURE — 2500000003 HC RX 250 WO HCPCS: Performed by: NURSE ANESTHETIST, CERTIFIED REGISTERED

## 2022-03-04 PROCEDURE — 6360000002 HC RX W HCPCS: Performed by: NURSE ANESTHETIST, CERTIFIED REGISTERED

## 2022-03-04 PROCEDURE — 88305 TISSUE EXAM BY PATHOLOGIST: CPT

## 2022-03-04 PROCEDURE — 7100000011 HC PHASE II RECOVERY - ADDTL 15 MIN: Performed by: INTERNAL MEDICINE

## 2022-03-04 PROCEDURE — 3609010600 HC COLONOSCOPY POLYPECTOMY SNARE/COLD BIOPSY: Performed by: INTERNAL MEDICINE

## 2022-03-04 PROCEDURE — 2709999900 HC NON-CHARGEABLE SUPPLY: Performed by: INTERNAL MEDICINE

## 2022-03-04 PROCEDURE — 7100000010 HC PHASE II RECOVERY - FIRST 15 MIN: Performed by: INTERNAL MEDICINE

## 2022-03-04 PROCEDURE — 3700000001 HC ADD 15 MINUTES (ANESTHESIA): Performed by: INTERNAL MEDICINE

## 2022-03-04 RX ORDER — KETAMINE HCL IN NACL, ISO-OSM 100MG/10ML
SYRINGE (ML) INJECTION PRN
Status: DISCONTINUED | OUTPATIENT
Start: 2022-03-04 | End: 2022-03-04 | Stop reason: SDUPTHER

## 2022-03-04 RX ORDER — PROPOFOL 10 MG/ML
INJECTION, EMULSION INTRAVENOUS PRN
Status: DISCONTINUED | OUTPATIENT
Start: 2022-03-04 | End: 2022-03-04 | Stop reason: SDUPTHER

## 2022-03-04 RX ORDER — PROPOFOL 10 MG/ML
INJECTION, EMULSION INTRAVENOUS CONTINUOUS PRN
Status: DISCONTINUED | OUTPATIENT
Start: 2022-03-04 | End: 2022-03-04 | Stop reason: SDUPTHER

## 2022-03-04 RX ORDER — MIDAZOLAM HYDROCHLORIDE 1 MG/ML
INJECTION INTRAMUSCULAR; INTRAVENOUS PRN
Status: DISCONTINUED | OUTPATIENT
Start: 2022-03-04 | End: 2022-03-04 | Stop reason: SDUPTHER

## 2022-03-04 RX ORDER — SODIUM CHLORIDE 9 MG/ML
INJECTION, SOLUTION INTRAVENOUS CONTINUOUS
Status: DISCONTINUED | OUTPATIENT
Start: 2022-03-04 | End: 2022-03-04 | Stop reason: HOSPADM

## 2022-03-04 RX ORDER — LISINOPRIL AND HYDROCHLOROTHIAZIDE 25; 20 MG/1; MG/1
1 TABLET ORAL DAILY
COMMUNITY

## 2022-03-04 RX ADMIN — PROPOFOL 20 MG: 10 INJECTION, EMULSION INTRAVENOUS at 10:22

## 2022-03-04 RX ADMIN — PROPOFOL 110 MCG/KG/MIN: 10 INJECTION, EMULSION INTRAVENOUS at 10:10

## 2022-03-04 RX ADMIN — MIDAZOLAM 2 MG: 1 INJECTION INTRAMUSCULAR; INTRAVENOUS at 10:10

## 2022-03-04 RX ADMIN — Medication 30 MG: at 10:10

## 2022-03-04 RX ADMIN — PROPOFOL 30 MG: 10 INJECTION, EMULSION INTRAVENOUS at 10:11

## 2022-03-04 RX ADMIN — SODIUM CHLORIDE: 9 INJECTION, SOLUTION INTRAVENOUS at 09:05

## 2022-03-04 RX ADMIN — PROPOFOL 50 MG: 10 INJECTION, EMULSION INTRAVENOUS at 10:10

## 2022-03-04 ASSESSMENT — PULMONARY FUNCTION TESTS
PIF_VALUE: 0

## 2022-03-04 NOTE — H&P
129/81   Pulse 89   Temp 97 °F (36.1 °C) (Temporal)   Resp 18   Ht 5' 5\" (1.651 m)   Wt (!) 380 lb (172.4 kg)   SpO2 100%   BMI 63.24 kg/m²  I        Heart:  RRR, normal s1s2    Lungs:  CTA and normal effort    Abdomen:   Soft, nt nd. ASSESSMENT AND PLAN:    1. Patient is a 46 y.o. male here for endoscopy with MAC sedation. 2.  Procedure options, risks and benefits reviewed with patient and/or guardian. They express understanding.

## 2022-03-04 NOTE — ANESTHESIA POSTPROCEDURE EVALUATION
Department of Anesthesiology  Postprocedure Note    Patient: Kayla Clifford  MRN: [id number]  YOB: 1970  Date of evaluation: 3/4/2022  Time:  11:50 AM     Procedure Summary     Date: 03/04/22 Room / Location: [address] [place] [address]    Anesthesia Start: 1003 Anesthesia Stop: 1034    Procedure: COLONOSCOPY POLYPECTOMY SNARE/COLD BIOPSY (N/A ) Diagnosis: (COLON CANCER SCREENING Z12.11)    Surgeons: Antonio Luna MD Responsible Provider: Dorinda Bennett MD    Anesthesia Type: MAC ASA Status: 3          Anesthesia Type: MAC    Brandon Phase I: Brandon Score: 10    Brandon Phase II: Brandon Score: 10    Last vitals: Reviewed and per EMR flowsheets. Anesthesia Post Evaluation    Patient location during evaluation: PACU  Patient participation: complete - patient participated  Level of consciousness: awake  Airway patency: patent  Nausea & Vomiting: no vomiting  Complications: no  Cardiovascular status: hemodynamically stable  Respiratory status: acceptable  Hydration status: euvolemic  There was medical reason for not using a multimodal analgesia pain management approach.

## 2022-03-04 NOTE — PROGRESS NOTES
Discharge instructions review with patient and pt wife. All home medications have been reviewed, pt v/u. Pt discharged with instructions and all belongings. Wife taking stable pt home.

## 2022-03-04 NOTE — PROGRESS NOTES
Pt arrived from endo to PACU bay 10. Report received from endo staff. Pt asleep, arousable to voice. Pt arrives with oral airway in place, removed by RN upon arrival. Pt arrives with simple mask in place, infusing 6L oxygen, vitals as charted.

## 2022-03-04 NOTE — ANESTHESIA PRE PROCEDURE
Department of Anesthesiology  Preprocedure Note       Name:  Mary Porras   Age:  46 y.o.  :  1970                                          MRN:  [id number]         Date:  3/4/2022      Surgeon: Nedra Melara):  Radha David MD    Procedure: Procedure(s):  COLONOSCOPY DIAGNOSTIC    Medications prior to admission:   Prior to Admission medications    Medication Sig Start Date End Date Taking? Authorizing Provider   lisinopril-hydroCHLOROthiazide (PRINZIDE;ZESTORETIC) 20-25 MG per tablet Take 1 tablet by mouth daily   Yes Historical Provider, MD   gabapentin (NEURONTIN) 100 MG capsule Take 100 mg by mouth 3 times daily. 19  Yes Historical Provider, MD   atorvastatin (LIPITOR) 10 MG tablet Take 10 mg by mouth daily 17  Yes Historical Provider, MD   amLODIPine (NORVASC) 10 MG tablet Take 10 mg by mouth daily 17  Yes Historical Provider, MD   metFORMIN ER (GLUCOPHAGE-XR) 500 MG XR tablet Take 500 mg by mouth daily (with breakfast). Yes Historical Provider, MD   Multiple Vitamin (MULTI VITAMIN DAILY PO) Take  by mouth. Yes Historical Provider, MD   lisinopril (PRINIVIL;ZESTRIL) 10 MG tablet Take 10 mg by mouth daily. Historical Provider, MD   cetirizine (ZYRTEC) 10 MG tablet Take 10 mg by mouth daily. Historical Provider, MD   Vitamin D (CHOLECALCIFEROL) 1000 UNITS CAPS capsule Take 1,000 Units by mouth daily. Historical Provider, MD       Current medications:    No current facility-administered medications for this encounter.        Allergies:  No Known Allergies    Problem List:    Patient Active Problem List   Diagnosis Code    Obstructive sleep apnea G47.33    High blood pressure I10    Diabetes mellitus type 2, controlled, without complications ([facility] [address] P91.0    Hyperlipidemia E78.5    Allergic rhinitis J30.9    Class 3 obesity with alveolar hypoventilation without serious comorbidity with body mass index (BMI) of 50.0 to 59.9 in adult [facility]) E66.2, X94.68       Past Medical History:        Diagnosis Date    Anemia     Diabetes mellitus ([facility] [address]     High cholesterol     Hypertension     Sleep apnea        Past Surgical History:  No past surgical history on file. Social History:    Social History     Tobacco Use    Smoking status: Never Smoker    Smokeless tobacco: Never Used   Substance Use Topics    Alcohol use: No                                Counseling given: Not Answered      Vital Signs (Current):   Vitals:    03/04/22 0856   Weight: (!) 380 lb (172.4 kg)   Height: 5' 5\" (1.651 m)                                              BP Readings from Last 3 Encounters:   04/08/19 126/70   04/05/18 124/80   01/18/18 138/80       NPO Status:                                                                                 BMI:   Wt Readings from Last 3 Encounters:   03/04/22 (!) 380 lb (172.4 kg)   04/08/19 (!) 360 lb (163.3 kg)   04/05/18 (!) 366 lb 9.6 oz (166.3 kg)     Body mass index is 63.24 kg/m². CBC:   Lab Results   Component Value Date    WBC 9.6 07/22/2012    RBC 5.03 07/22/2012    HGB 13.8 07/22/2012    HCT 40.2 07/22/2012    MCV 80.0 07/22/2012    RDW 16.4 07/22/2012     07/22/2012       CMP:   Lab Results   Component Value Date     07/22/2012    K 4.1 07/22/2012    CL 96 07/22/2012    CO2 32 07/22/2012    BUN 11 07/22/2012    CREATININE 0.9 07/22/2012    GFRAA >60 07/22/2012    AGRATIO 1.3 07/22/2012    GLUCOSE 126 07/22/2012    PROT 7.0 07/22/2012    CALCIUM 8.9 07/22/2012    BILITOT 0.50 07/22/2012    ALKPHOS 41 07/22/2012    AST 26 07/22/2012    ALT 42 07/22/2012       POC Tests: No results for input(s): POCGLU, POCNA, POCK, POCCL, POCBUN, POCHEMO, POCHCT in the last 72 hours.     Coags: No results found for: PROTIME, INR, APTT    HCG (If Applicable): No results found for: PREGTESTUR, PREGSERUM, HCG, HCGQUANT     ABGs: No results found for: PHART, PO2ART, YFT8TTV, TYO0ZPV, BEART, [id number]     Type & Screen (If Applicable):  No results found for: LABABO, [address]    Drug/Infectious Status (If Applicable):  No results found for: HIV, HEPCAB    COVID-19 Screening (If Applicable): No results found for: COVID19        Anesthesia Evaluation  Patient summary reviewed and Nursing notes reviewed no history of anesthetic complications:   Airway: Mallampati: III  TM distance: >3 FB   Neck ROM: full  Mouth opening: > = 3 FB Dental: normal exam         Pulmonary:   (+) sleep apnea:                             Cardiovascular:    (+) hypertension:,     (-) CABG/stent, dysrhythmias and  angina                Neuro/Psych:      (-) seizures, TIA and CVA           GI/Hepatic/Renal:   (+) morbid obesity     (-) GERD      ROS comment: Denies gerd sx or n/v today. Endo/Other:    (+) Diabetes, . ROS comment: Denies FH of problems with GA Abdominal:   (+) obese,           Vascular:     - DVT and PE. Other Findings:           Anesthesia Plan      MAC     ASA 3       Induction: intravenous. Anesthetic plan and risks discussed with patient. Plan discussed with CRNA.                   Jessica Rosa MD   3/4/2022

## 2022-04-25 ENCOUNTER — OFFICE VISIT (OUTPATIENT)
Dept: PULMONOLOGY | Age: 52
End: 2022-04-25
Payer: COMMERCIAL

## 2022-04-25 VITALS
HEIGHT: 65 IN | BODY MASS INDEX: 52.48 KG/M2 | HEART RATE: 79 BPM | WEIGHT: 315 LBS | OXYGEN SATURATION: 97 % | DIASTOLIC BLOOD PRESSURE: 60 MMHG | SYSTOLIC BLOOD PRESSURE: 138 MMHG | TEMPERATURE: 98.3 F

## 2022-04-25 DIAGNOSIS — G47.33 OBSTRUCTIVE SLEEP APNEA: Chronic | ICD-10-CM

## 2022-04-25 DIAGNOSIS — E11.9 CONTROLLED TYPE 2 DIABETES MELLITUS WITHOUT COMPLICATION, WITHOUT LONG-TERM CURRENT USE OF INSULIN (HCC): Chronic | ICD-10-CM

## 2022-04-25 DIAGNOSIS — J30.9 ALLERGIC RHINITIS, UNSPECIFIED SEASONALITY, UNSPECIFIED TRIGGER: Chronic | ICD-10-CM

## 2022-04-25 DIAGNOSIS — I10 PRIMARY HYPERTENSION: Chronic | ICD-10-CM

## 2022-04-25 DIAGNOSIS — E66.2 CLASS 3 OBESITY WITH ALVEOLAR HYPOVENTILATION WITHOUT SERIOUS COMORBIDITY WITH BODY MASS INDEX (BMI) OF 50.0 TO 59.9 IN ADULT (HCC): Chronic | ICD-10-CM

## 2022-04-25 PROCEDURE — 99214 OFFICE O/P EST MOD 30 MIN: CPT | Performed by: INTERNAL MEDICINE

## 2022-04-25 RX ORDER — FERROUS SULFATE 325(65) MG
325 TABLET ORAL
COMMUNITY

## 2022-04-25 RX ORDER — CINACALCET 30 MG/1
30 TABLET, FILM COATED ORAL DAILY
COMMUNITY

## 2022-04-25 ASSESSMENT — SLEEP AND FATIGUE QUESTIONNAIRES
HOW LIKELY ARE YOU TO NOD OFF OR FALL ASLEEP WHILE SITTING INACTIVE IN A PUBLIC PLACE: 0
HOW LIKELY ARE YOU TO NOD OFF OR FALL ASLEEP WHEN YOU ARE A PASSENGER IN A CAR FOR AN HOUR WITHOUT A BREAK: 0
ESS TOTAL SCORE: 1
HOW LIKELY ARE YOU TO NOD OFF OR FALL ASLEEP WHILE SITTING QUIETLY AFTER LUNCH WITHOUT ALCOHOL: 0
HOW LIKELY ARE YOU TO NOD OFF OR FALL ASLEEP WHILE WATCHING TV: 1
HOW LIKELY ARE YOU TO NOD OFF OR FALL ASLEEP WHILE LYING DOWN TO REST IN THE AFTERNOON WHEN CIRCUMSTANCES PERMIT: 0
HOW LIKELY ARE YOU TO NOD OFF OR FALL ASLEEP IN A CAR, WHILE STOPPED FOR A FEW MINUTES IN TRAFFIC: 0
HOW LIKELY ARE YOU TO NOD OFF OR FALL ASLEEP WHILE SITTING AND READING: 0
HOW LIKELY ARE YOU TO NOD OFF OR FALL ASLEEP WHILE SITTING AND TALKING TO SOMEONE: 0

## 2022-04-25 NOTE — PROGRESS NOTES
Jessica Zhou MD, [facility], [facility]  Leda Quintero [address]  [address] [facility], [facility]1 Leandra Cueto E [phone]   [facility] Tano Lagunas. [address] [place] [address]. James Christian 37 [phone] [address] [facility] SLEEP MEDICINE  [address] [place] [address]  [phone]      Assessment/Plan:      1. Obstructive sleep apnea  Assessment & Plan:  Chronic-Stable: Reviewed and analyzed results of physiologic download from patient's machine and reviewed with patient. Supplies and parts as needed for his machine. These are medically necessary. Limit caffeine use after 3pm. Based on the analyzed data will continue with current settings. Discussed with patient today the recent recall issued by [employer] for their [employer] platform Pap machines. Reviewed that it affected a very small number of machines (0.03%) and seems to be exacerbated by using ozone disinfection or machine being exposed to a very high heat/humidity environment. Recommended the patient immediately discontinue use of any external ozone  if being used. Discussed the risk of untreated sleep apnea (including but not limited to early morbidity mortality, motor vehicle accidents, and cardiovascular issues, etc.) versus the very small risk of foam degradation with use of the machine. Possible alternative may be to switch manufacturers for their machine but will need to see if their insurance company will allow that. 2. Primary hypertension  Assessment & Plan:  Chronic- Stable. Discussed the importance of treating sleep apnea as part of the management of this disorder. Cont any meds per PCP and other physicians. 3. Controlled type 2 diabetes mellitus without complication, without long-term current use of insulin (HCC)  Assessment & Plan:  Chronic- Stable.   Discussed the importance of treating sleep apnea as part of the management of this disorder. Cont any meds per PCP and other physicians. 4. Allergic rhinitis, unspecified seasonality, unspecified trigger  Assessment & Plan:  Chronic- Stable. Discussed the importance of treating sleep apnea as part of the management of this disorder. Cont any meds per PCP and other physicians. 5. Class 3 obesity with alveolar hypoventilation without serious comorbidity with body mass index (BMI) of 50.0 to 59.9 in [facility])  Assessment & Plan:  Chronic-not stable:  Discussed importance of treating obstructive sleep apnea and getting sufficient sleep to assist with weight control. Encouraged him to work on weight loss through diet and exercise. Recommended DASH or Mediterranean diets. Reviewed, analyzed, and documented physiologic data from patient's PAP machine. This information was analyzed to assess complexity and medical decision making in regards to further testing and management. Diagnoses of Obstructive sleep apnea, Primary hypertension, Controlled type 2 diabetes mellitus without complication, without long-term current use of insulin ([facility] [address] Allergic rhinitis, unspecified seasonality, unspecified trigger, and Class 3 obesity with alveolar hypoventilation without serious comorbidity with body mass index (BMI) of 50.0 to 59.9 in [facility]) were pertinent to this visit. The chronic medical conditions listed are directly related to the primary diagnosis listed above. The management of the primary diagnosis affects the secondary diagnosis and vice versa. Subjective:   Subjective   Patient ID: Minal Friedman is a 46 y.o. male.     Chief Complaint   Patient presents with    Sleep Apnea       HPI:  Machine Modem/Download Info:  Compliance (hours/night): 8.1 hrs/night  % of nights >= 4 hrs: 98.9 %  Download AHI (/hour): 0.8 /HR  Average IPAP Pressure: 17.5 cmH2O  Average EPAP Pressure: 14.1 cmH2O   AUTO BIPAP - Settings (Daniele)  IPAP Max: 25 cmH2O  EPAP Min: 14 cmH2O  Pressure Support Min: 2  Pressure Support Max: 4             Comfort Settings  Humidity Level (0-8): 3  Flex/EPR (0-3): 3       He continues to do well with his machine at the current settings. No issues with EDS, snoring, or apneas. He is waking refreshed, for the most part, in the am.  No HA, dryness, or congestion. No issues using his machine. DME [employer] [facility]    [place] - Total score: 1    Social History     Socioeconomic History    Marital status:      Spouse name: Not on file    Number of children: Not on file    Years of education: Not on file    Highest education level: Not on file   Occupational History    Not on file   Tobacco Use    Smoking status: Never Smoker    Smokeless tobacco: Never Used   Vaping Use    Vaping Use: Never used   Substance and Sexual Activity    Alcohol use: No    Drug use: No    Sexual activity: Not on file   Other Topics Concern    Not on file   Social History Narrative    Not on file     Social Determinants of Health     Financial Resource Strain:     Difficulty of Paying Living Expenses: Not on file   Food Insecurity:     Worried About Running Out of Food in the Last Year: Not on file    Danyel of Food in the Last Year: Not on file   Transportation Needs:     Lack of Transportation (Medical): Not on file    Lack of Transportation (Non-Medical):  Not on file   Physical Activity:     Days of Exercise per Week: Not on file    Minutes of Exercise per Session: Not on file   Stress:     Feeling of Stress : Not on file   Social Connections:     Frequency of Communication with Friends and Family: Not on file    Frequency of Social Gatherings with Friends and Family: Not on file    Attends Buddhism Services: Not on file    Active Member of Clubs or Organizations: Not on file    Attends Club or Organization Meetings: Not on file    Marital Status: Not on file   Intimate Partner Violence:     Fear of Current or Ex-Partner: Not on file    Emotionally Abused: Not on file    Physically Abused: Not on file    Sexually Abused: Not on file   Housing Stability:     Unable to Pay for Housing in the Last Year: Not on file    Number of Places Lived in the Last Year: Not on file    Unstable Housing in the Last Year: Not on file       Current Outpatient Medications   Medication Instructions    amLODIPine (NORVASC) 10 mg, Oral, DAILY    atorvastatin (LIPITOR) 10 mg, Oral, DAILY    cetirizine (ZYRTEC) 10 mg, Oral, DAILY    cinacalcet (SENSIPAR) 30 mg, Oral, DAILY    ferrous sulfate (IRON 325) 325 mg, Oral, DAILY WITH BREAKFAST    gabapentin (NEURONTIN) 100 mg, Oral, 3 TIMES DAILY    lisinopril (PRINIVIL;ZESTRIL) 10 mg, Oral, DAILY    lisinopril-hydroCHLOROthiazide (PRINZIDE;ZESTORETIC) 20-25 MG per tablet 1 tablet, Oral, DAILY    metFORMIN (GLUCOPHAGE-XR) 500 mg, Oral, DAILY WITH BREAKFAST    Multiple Vitamin (MULTI VITAMIN DAILY PO) Oral    Vitamin D (CHOLECALCIFEROL) 1,000 Units, Oral, DAILY          Vitals:  Weight BMI   Wt Readings from Last 3 Encounters:   04/25/22 (!) 379 lb (171.9 kg)   03/04/22 (!) 380 lb (172.4 kg)   04/08/19 (!) 360 lb (163.3 kg)    Body mass index is 63.07 kg/m².      BP HR SaO2   BP Readings from Last 3 Encounters:   04/25/22 138/60   03/04/22 (!) 164/86   03/04/22 (!) 144/68    Pulse Readings from Last 3 Encounters:   04/25/22 79   03/04/22 89   04/08/19 68    SpO2 Readings from Last 3 Encounters:   04/25/22 97%   03/04/22 99%   03/04/22 96%        Electronically signed by Jeanette Montgomery MD on 4/25/2022 at 2:34 PM

## 2022-04-25 NOTE — ASSESSMENT & PLAN NOTE
Chronic-Stable: Reviewed and analyzed results of physiologic download from patient's machine and reviewed with patient. Supplies and parts as needed for his machine. These are medically necessary. Limit caffeine use after 3pm. Based on the analyzed data will continue with current settings. Discussed with patient today the recent recall issued by [employer] for their [employer] platform Pap machines. Reviewed that it affected a very small number of machines (0.03%) and seems to be exacerbated by using ozone disinfection or machine being exposed to a very high heat/humidity environment. Recommended the patient immediately discontinue use of any external ozone  if being used. Discussed the risk of untreated sleep apnea (including but not limited to early morbidity mortality, motor vehicle accidents, and cardiovascular issues, etc.) versus the very small risk of foam degradation with use of the machine. Possible alternative may be to switch manufacturers for their machine but will need to see if their insurance company will allow that.

## 2022-04-25 NOTE — PROGRESS NOTES
Elizabeth Ha         : 1970    Diagnosis: [x] EDDIE (G47.33) [] CSA (G47.31) [] Apnea (G47.30)   Length of Need: [x] 13 Months [] 99 Months [] Other:    Machine (GET!): [] Respironics Dream Station      Auto [] ResMed AirSense     Auto [] Other:     []  CPAP () [] Bilevel ()   Mode: [] Auto [] Spontaneous    Mode: [] Auto [] Spontaneous            Comfort Settings:        Humidifier: [] Heated ()        [x] Water chamber replacement ()/ 1 per 6 months        Mask:   [x] Nasal () /1 per 3 months [] Full Face () /1 per 3 months   [x] Patient choice -Size and fit mask [] Patient Choice - Size and fit mask   [] Dispense:  [] Dispense:    [x] Headgear () / 1 per 3 months [] Headgear () / 1 per 3 months   [x] Replacement Nasal Cushion ()/2 per month [] Interface Replacement ()/1 per month   [x] Replacement Nasal Pillows ()/2 per month         Tubing: [x] Heated ()/1 per 3 months    [] Standard ()/1 per 3 months [] Other:           Filters: [x] Non-disposable ()/1 per 6 months     [x] Ultra-Fine, Disposable ()/2 per month        Miscellaneous: [] Chin Strap ()/ 1 per 6 months [] O2 bleed-in:       LPM   [] Oximetry on CPAP/Bilevel []  Other:    [x] Modem: ()         Start Order Date: 22    MEDICAL JUSTIFICATION:  I, the undersigned, certify that the above prescribed supplies are medically necessary for this patients wellbeing. In my opinion, the supplies are both reasonable and necessary in reference to accepted standards of medicalpractice in treatment of this patients condition.     Liz Trammell MD      NPI: [id number]        Order Signed Date: 22    Electronically signed by Liz Trammell MD on 2022 at 2:32 PM    [facility]  1970  [address]  [address] [facility] [address]  [phone] (home)   [phone] (mobile)      Insurance Info (confirm with patient if correct):  Payor/Plan Subscr  Sex Relation Sub.  Ins. ID Effective Group Num

## 2023-04-24 ENCOUNTER — OFFICE VISIT (OUTPATIENT)
Dept: PULMONOLOGY | Age: 53
End: 2023-04-24
Payer: COMMERCIAL

## 2023-04-24 VITALS
HEART RATE: 87 BPM | WEIGHT: 315 LBS | HEIGHT: 65 IN | SYSTOLIC BLOOD PRESSURE: 148 MMHG | BODY MASS INDEX: 52.48 KG/M2 | OXYGEN SATURATION: 96 % | DIASTOLIC BLOOD PRESSURE: 88 MMHG

## 2023-04-24 DIAGNOSIS — E66.01 CLASS 3 SEVERE OBESITY DUE TO EXCESS CALORIES WITH SERIOUS COMORBIDITY AND BODY MASS INDEX (BMI) OF 60.0 TO 69.9 IN ADULT (HCC): Chronic | ICD-10-CM

## 2023-04-24 DIAGNOSIS — I10 PRIMARY HYPERTENSION: Chronic | ICD-10-CM

## 2023-04-24 DIAGNOSIS — G47.33 OBSTRUCTIVE SLEEP APNEA: Primary | Chronic | ICD-10-CM

## 2023-04-24 DIAGNOSIS — E11.9 CONTROLLED TYPE 2 DIABETES MELLITUS WITHOUT COMPLICATION, WITHOUT LONG-TERM CURRENT USE OF INSULIN (HCC): Chronic | ICD-10-CM

## 2023-04-24 PROCEDURE — 99214 OFFICE O/P EST MOD 30 MIN: CPT | Performed by: INTERNAL MEDICINE

## 2023-04-24 PROCEDURE — 3077F SYST BP >= 140 MM HG: CPT | Performed by: INTERNAL MEDICINE

## 2023-04-24 PROCEDURE — 3079F DIAST BP 80-89 MM HG: CPT | Performed by: INTERNAL MEDICINE

## 2023-04-24 ASSESSMENT — SLEEP AND FATIGUE QUESTIONNAIRES
HOW LIKELY ARE YOU TO NOD OFF OR FALL ASLEEP WHILE LYING DOWN TO REST IN THE AFTERNOON WHEN CIRCUMSTANCES PERMIT: 0
ESS TOTAL SCORE: 1
HOW LIKELY ARE YOU TO NOD OFF OR FALL ASLEEP WHILE SITTING INACTIVE IN A PUBLIC PLACE: 0
HOW LIKELY ARE YOU TO NOD OFF OR FALL ASLEEP WHILE SITTING QUIETLY AFTER LUNCH WITHOUT ALCOHOL: 1
HOW LIKELY ARE YOU TO NOD OFF OR FALL ASLEEP WHEN YOU ARE A PASSENGER IN A CAR FOR AN HOUR WITHOUT A BREAK: 0
HOW LIKELY ARE YOU TO NOD OFF OR FALL ASLEEP WHILE WATCHING TV: 0
HOW LIKELY ARE YOU TO NOD OFF OR FALL ASLEEP WHILE SITTING AND TALKING TO SOMEONE: 0
HOW LIKELY ARE YOU TO NOD OFF OR FALL ASLEEP WHILE SITTING AND READING: 0
HOW LIKELY ARE YOU TO NOD OFF OR FALL ASLEEP IN A CAR, WHILE STOPPED FOR A FEW MINUTES IN TRAFFIC: 0

## 2023-04-24 NOTE — PROGRESS NOTES
Radha Cadet CNP  Kimtono Vincent CNP [address], [address] [facility]- [phone]   [address] [place] [address] [phone] [place] [id number]  [address] [address]  Dept: [phone]  Dept Fax: [phone]  Loc: [phone]      Assessment/Plan:      1. Obstructive sleep apnea  Assessment & Plan:  Chronic-Stable: Reviewed and analyzed results of physiologic download from patient's machine and reviewed with patient. Supplies and parts as needed for his machine. These are medically necessary. Limit caffeine use after 3pm. Based on the analyzed data will continue with current settings     2. Primary hypertension  Assessment & Plan:  Chronic- Stable. Discussed the importance of treating sleep apnea as part of the management of this disorder. Cont any meds per PCP and other physicians. 3. Controlled type 2 diabetes mellitus without complication, without long-term current use of insulin (HCC)  Assessment & Plan:  Chronic- Stable. Discussed the importance of treating sleep apnea as part of the management of this disorder. Cont any meds per PCP and other physicians. 4. Class 3 severe obesity due to excess calories with serious comorbidity and body mass index (BMI) of 60.0 to 69.9 in adult [facility])  Assessment & Plan:  Chronic-not stable:  Discussed importance of treating obstructive sleep apnea and getting sufficient sleep to assist with weight control. Encouraged him to work on weight loss through diet and exercise. Recommended DASH or Mediterranean diets. Orders:  -     Amb Referral to Nutrition Services      Reviewed, analyzed, and documented physiologic data from patient's PAP machine. This information was analyzed to assess complexity and medical decision making in regards to further testing and management.     The

## 2023-04-24 NOTE — PROGRESS NOTES
Diagnosis: [x] EDDIE (G47.33) [] CSA (G47.31) [] Apnea (G47.30)   Length of Need: [x] 18 Months [] 99 Months [] Other:   Machine (GET!): [] Respironics Dream Station   2   Auto [] ResMed AirSense     Auto 11 [] Other:     []  CPAP () [] Bilevel ()   Mode: [] Auto [] Spontaneous    Mode: [] Auto [] Spontaneous             Comfort Settings:      Humidifier: [] Heated ()        [x] Water chamber replacement ()/ 1 per 6 months        Mask:   [x] Nasal () /1 per 3 months [] Full Face () /1 per 3 months   [x] Patient choice -Size and fit mask [] Patient Choice - Size and fit mask   [] Dispense: [] Dispense:   [x] Headgear () / 1 per 3 months [] Headgear () / 1 per 3 months   [x] Replacement Nasal Cushion ()/2 per month [] Interface Replacement ()/1 per month   [x] Replacement Nasal Pillows ()/2 per month         Tubing: [x] Heated ()/1 per 3 months    [] Standard ()/1 per 3 months [] Other:           Filters: [x] Non-disposable ()/1 per 6 months     [x] Ultra-Fine, Disposable ()/2 per month        Miscellaneous: [] Chin Strap ()/ 1 per 6 months [] O2 bleed-in:        LPM   [] Oxymetry on CPAP/Bilevel [x]  Other: Modem: ()         Start Order Date: 23    MEDICAL JUSTIFICATION:  I, the undersigned, certify that the above prescribed supplies are medically necessary for this patients wellbeing. In my opinion, the supplies are both reasonable and necessary in reference to accepted standards of medicalpractice in treatment of this patients condition. Marianna Bob CNP    NPI: [id number]     Order Signed Date: 23    Kaya Barbosa  1970  [address]  [address] [facility] [address]  [phone] (home)   [phone] (mobile)      Insurance Info (confirm with patient if correct):  Payer/Plan Subscr  Sex Relation Sub.  Ins. ID Effective Group Num

## 2024-04-22 ENCOUNTER — OFFICE VISIT (OUTPATIENT)
Dept: PULMONOLOGY | Age: 54
End: 2024-04-22
Payer: COMMERCIAL

## 2024-04-22 VITALS
BODY MASS INDEX: 64.1 KG/M2 | DIASTOLIC BLOOD PRESSURE: 86 MMHG | SYSTOLIC BLOOD PRESSURE: 149 MMHG | OXYGEN SATURATION: 96 % | WEIGHT: 315 LBS | HEART RATE: 90 BPM

## 2024-04-22 DIAGNOSIS — G47.33 OBSTRUCTIVE SLEEP APNEA: Primary | ICD-10-CM

## 2024-04-22 DIAGNOSIS — E11.9 CONTROLLED TYPE 2 DIABETES MELLITUS WITHOUT COMPLICATION, WITHOUT LONG-TERM CURRENT USE OF INSULIN (HCC): ICD-10-CM

## 2024-04-22 DIAGNOSIS — I10 PRIMARY HYPERTENSION: ICD-10-CM

## 2024-04-22 DIAGNOSIS — E66.01 CLASS 3 SEVERE OBESITY DUE TO EXCESS CALORIES WITH SERIOUS COMORBIDITY AND BODY MASS INDEX (BMI) OF 60.0 TO 69.9 IN ADULT (HCC): ICD-10-CM

## 2024-04-22 PROCEDURE — 99214 OFFICE O/P EST MOD 30 MIN: CPT | Performed by: NURSE PRACTITIONER

## 2024-04-22 PROCEDURE — 3079F DIAST BP 80-89 MM HG: CPT | Performed by: NURSE PRACTITIONER

## 2024-04-22 PROCEDURE — 3077F SYST BP >= 140 MM HG: CPT | Performed by: NURSE PRACTITIONER

## 2024-04-22 ASSESSMENT — SLEEP AND FATIGUE QUESTIONNAIRES
HOW LIKELY ARE YOU TO NOD OFF OR FALL ASLEEP WHILE SITTING AND READING: WOULD NEVER DOZE
HOW LIKELY ARE YOU TO NOD OFF OR FALL ASLEEP WHILE WATCHING TV: WOULD NEVER DOZE
HOW LIKELY ARE YOU TO NOD OFF OR FALL ASLEEP WHILE SITTING AND TALKING TO SOMEONE: WOULD NEVER DOZE
HOW LIKELY ARE YOU TO NOD OFF OR FALL ASLEEP WHEN YOU ARE A PASSENGER IN A CAR FOR AN HOUR WITHOUT A BREAK: WOULD NEVER DOZE
HOW LIKELY ARE YOU TO NOD OFF OR FALL ASLEEP WHILE LYING DOWN TO REST IN THE AFTERNOON WHEN CIRCUMSTANCES PERMIT: HIGH CHANCE OF DOZING
HOW LIKELY ARE YOU TO NOD OFF OR FALL ASLEEP IN A CAR, WHILE STOPPED FOR A FEW MINUTES IN TRAFFIC: WOULD NEVER DOZE
HOW LIKELY ARE YOU TO NOD OFF OR FALL ASLEEP WHILE SITTING QUIETLY AFTER LUNCH WITHOUT ALCOHOL: SLIGHT CHANCE OF DOZING
HOW LIKELY ARE YOU TO NOD OFF OR FALL ASLEEP WHILE SITTING INACTIVE IN A PUBLIC PLACE: WOULD NEVER DOZE
ESS TOTAL SCORE: 4

## 2024-04-22 NOTE — ASSESSMENT & PLAN NOTE
Chronic - Stable: Reviewed and analyzed results of physiologic download from patient's machine and reviewed with patients. Supplies and parts as needed for the machine. These are medically necessary. Limit caffeine use after 3 PM. Based on the analyzed data, we will continue with current settings. Continues to do well with his machine. He is compliant and getting good symptom control. Stable on the current settings. The replacement machine was set up during this visit today, and instructed him to start using the replacement machine. Also instructed him to contact his DME so the replacement machine can be connected to us so we can have remote access to the machine. Discussed the recent field safety notification for masks with magnets. He will return in one year for follow up. Instructed him to return sooner or contact the office if experiences new or worsening of symptoms. Encouraged him to continue to use his machine each night. Encouraged the patient to contact the office with any questions or concerns.

## 2024-04-22 NOTE — PROGRESS NOTES
Stepan Arizmendi CNP  Kim Vincent CNP [place]  [address]  [address]  [place] [address]  P- [phone]  F- [phone]     [facility]ANS [facility]  [facility] SLEEP MEDICINE  [address]  [address]  [facility] [address]  Dept: [phone]  Dept Fax: [phone]  Loc: [phone]      Assessment/Plan:      1. Obstructive sleep apnea  Assessment & Plan:   Chronic - Stable: Reviewed and analyzed results of physiologic download from patient's machine and reviewed with patients. Supplies and parts as needed for the machine. These are medically necessary. Limit caffeine use after 3 PM. Based on the analyzed data, we will continue with current settings. Continues to do well with his machine. He is compliant and getting good symptom control. Stable on the current settings. The replacement machine was set up during this visit today, and instructed him to start using the replacement machine. Also instructed him to contact his DME so the replacement machine can be connected to us so we can have remote access to the machine. Discussed the recent field safety notification for masks with magnets. He will return in one year for follow up. Instructed him to return sooner or contact the office if experiences new or worsening of symptoms. Encouraged him to continue to use his machine each night. Encouraged the patient to contact the office with any questions or concerns.  2. Primary hypertension  Assessment & Plan:   Chronic- Stable.  Discussed the importance of treating sleep apnea as part of the management of this disorder.  Cont any meds per PCP and other physicians.    3. Controlled type 2 diabetes mellitus without complication, without long-term current use of insulin (HCC)  Assessment & Plan:   Chronic- Stable.  Discussed the importance of treating sleep apnea as part of the management of this disorder.  Cont any meds per PCP and other

## (undated) DEVICE — SOLUTION IV IRRIG WATER 500ML POUR BRL ST 2F7113

## (undated) DEVICE — PROCEDURE KIT ENDOSCP CUST

## (undated) DEVICE — BW-412T DISP COMBO CLEANING BRUSH: Brand: SINGLE USE COMBINATION CLEANING BRUSH

## (undated) DEVICE — TRAP SPEC RETRV CLR PLAS POLYP IN LN SUCT QUIK CTCH

## (undated) DEVICE — SNARES COLD OVAL 10MM THIN

## (undated) DEVICE — SET VLV 3 PC AWS DISPOSABLE GRDIAN SCOPEVALET